# Patient Record
Sex: MALE | Race: ASIAN | NOT HISPANIC OR LATINO
[De-identification: names, ages, dates, MRNs, and addresses within clinical notes are randomized per-mention and may not be internally consistent; named-entity substitution may affect disease eponyms.]

---

## 2021-01-04 ENCOUNTER — APPOINTMENT (OUTPATIENT)
Dept: HEPATOLOGY | Facility: CLINIC | Age: 41
End: 2021-01-04

## 2021-02-13 ENCOUNTER — EMERGENCY (EMERGENCY)
Facility: HOSPITAL | Age: 41
LOS: 1 days | Discharge: HOME | End: 2021-02-13
Attending: EMERGENCY MEDICINE
Payer: MEDICAID

## 2021-02-13 VITALS
RESPIRATION RATE: 20 BRPM | OXYGEN SATURATION: 99 % | WEIGHT: 199.96 LBS | HEART RATE: 84 BPM | SYSTOLIC BLOOD PRESSURE: 117 MMHG | TEMPERATURE: 98 F | DIASTOLIC BLOOD PRESSURE: 76 MMHG | HEIGHT: 67 IN

## 2021-02-13 VITALS
DIASTOLIC BLOOD PRESSURE: 64 MMHG | TEMPERATURE: 98 F | OXYGEN SATURATION: 100 % | RESPIRATION RATE: 18 BRPM | HEART RATE: 72 BPM | SYSTOLIC BLOOD PRESSURE: 132 MMHG

## 2021-02-13 DIAGNOSIS — K64.4 RESIDUAL HEMORRHOIDAL SKIN TAGS: Chronic | ICD-10-CM

## 2021-02-13 LAB
HCT VFR BLD CALC: 43.8 % — SIGNIFICANT CHANGE UP (ref 39–50)
HGB BLD-MCNC: 15.1 G/DL — SIGNIFICANT CHANGE UP (ref 13–17)
MCHC RBC-ENTMCNC: 30.3 PG — SIGNIFICANT CHANGE UP (ref 27–34)
MCHC RBC-ENTMCNC: 34.5 GM/DL — SIGNIFICANT CHANGE UP (ref 32–36)
MCV RBC AUTO: 87.8 FL — SIGNIFICANT CHANGE UP (ref 80–100)
NRBC # BLD: 0 /100 WBCS — SIGNIFICANT CHANGE UP (ref 0–0)
PLATELET # BLD AUTO: 288 K/UL — SIGNIFICANT CHANGE UP (ref 150–400)
RBC # BLD: 4.99 M/UL — SIGNIFICANT CHANGE UP (ref 4.2–5.8)
RBC # FLD: 11.5 % — SIGNIFICANT CHANGE UP (ref 10.3–14.5)
WBC # BLD: 5.94 K/UL — SIGNIFICANT CHANGE UP (ref 3.8–10.5)
WBC # FLD AUTO: 5.94 K/UL — SIGNIFICANT CHANGE UP (ref 3.8–10.5)

## 2021-02-13 PROCEDURE — 85027 COMPLETE CBC AUTOMATED: CPT

## 2021-02-13 PROCEDURE — 99283 EMERGENCY DEPT VISIT LOW MDM: CPT

## 2021-02-13 PROCEDURE — 99284 EMERGENCY DEPT VISIT MOD MDM: CPT

## 2021-02-13 NOTE — ED PROVIDER NOTE - NSFOLLOWUPINSTRUCTIONS_ED_ALL_ED_FT
-Please take Tylenol up to 650 mg every 6 hours as needed for pain and/or Motrin up to 600 mg every 8 hours as needed for pain    -Please take any prescribed medications as instructed by your PMD    - Be sure to return to the ED if you develop new or worsening symptoms. Specific signs and symptoms to be vigilant of: fever or chills, chest pain, difficulty breathing, palpitations, loss of consciousness, headache, vision changes, slurred speech, difficulty swallowing or drooling, facial droop, weakness in the arms or legs, numbness or tingling, abdominal pain, nausea or vomiting, diarrhea, constipation, blood in the stool or urine, pain on urination, difficulty urinating.     -Please follow up with surgery clinic for external hemorrhoids    -Please use stool softeners to help with constipation

## 2021-02-13 NOTE — ED ADULT NURSE NOTE - OBJECTIVE STATEMENT
Pt is a 39 yo male with the co rectal pain worsening over the past year. Pt states he has a history of hemorrhoids and has had several surgeries over the past year. Pt states that he occi Pt is a 41 yo male with the co rectal pain worsening over the past year. Pt states he has a history of hemorrhoids and has had several surgeries over the past year. Pt states that he has rectal bleeding every day but the amount of bleeding changes per day. Pt denies any CP or SOB no N/V/D no cough fever or chills. Pt reports taking tylenol at home when the pain worsens at times. He denies any weakness or dizziness.  No other pmh.

## 2021-02-13 NOTE — ED PROVIDER NOTE - ATTENDING CONTRIBUTION TO CARE
RGUJRAL 39yo male hx hemorrhoid (for years) presents with rectal burning and bleeding. Patient states he has had multiple procedures in [place] but a week later starts to bleed again. Denies any abdominal pain, n/v/f/c. No diarrhea, nml bms.   On exam, Patient is awake,alert,oriented x 3. Patient is well appearing and in no acute distress. Patient's chest is clear to ausculation, +s1s2. Abdomen is soft nd/nt +BS. Extremity with no swelling or calf tenderness. Rectal exam, + small external hemorrhoid. nontender/no fluctuance/ no perineal involvement.  Denies any chest pain, sob, lightheadedness.   Discussed w patient at length, discharge with [facility]. Will enroll patient in ed referral program.

## 2021-02-13 NOTE — ED PROVIDER NOTE - OBJECTIVE STATEMENT
39y/o M w/ h/o external hemorrhoids s/p surgical interventionsx4 at [facility] p/w painful hemorrhoids for the past 2 mo, worse with bowel movements. Denies fevers, chills, n/v/abdominal pain, urinary complaints. 41y/o M w/ h/o external hemorrhoids s/p surgical interventionsx4 at [facility] p/w painful hemorrhoids for the past 2 mo, worse with bowel movements. Tried sitz baths prior to surgeries with minimal relief, reports mild bleeding. Denies syncope, fevers, chills, n/v/abdominal pain, urinary complaints.

## 2021-02-13 NOTE — ED PROVIDER NOTE - NSFOLLOWUPCLINICS_GEN_ALL_ED_FT
[facility]  [address] [place] [address]  Phone: [phone]  Fax: [phone]  Follow Up Time: 1-3 Days

## 2021-02-13 NOTE — ED PROVIDER NOTE - PATIENT PORTAL LINK FT
You can access the FollowMyHealth Patient Portal offered by [facility] by registering at the following website: [email or web address]. By joining [facility]’s FollowMyHealth portal, you will also be able to view your health information using other applications (apps) compatible with our system.

## 2021-02-16 PROBLEM — Z00.00 ENCOUNTER FOR PREVENTIVE HEALTH EXAMINATION: Status: ACTIVE | Noted: 2021-02-16

## 2025-04-17 NOTE — ED PROVIDER NOTE - CPE EDP EYES NORM
Dr. Carolina Post Op Cataract Instructions  For Nahomy COLBY Osvaldo    See Dr. Carolina tomorrow at the office. Wear either your glasses or the sunglasses (if jeffry outside) provided in the kit for protection. Tape the shield over the eye when sleeping for 1 week (unless you have a breathing mask and can't fit the shield on). Avoid bumping or rubbing the eye. Stop using the eye lid scrubs in the operative eye.    You may resume your diet and regular medications. Take Tylenol for pain (unless you are allergic).    Because you had sedatives, make sure you have someone stay with you on the first night after cataract surgery. Please be careful to avoid falling.    Driving can be resumed 24 hours after surgery if you see well and are comfortable drving.    EYE DROPS are put in by looking up with both eyes open while pulling down the lower lid. Put one drop into the ''crease'' between the lower lid and the eye. Close the eyes for 30 seconds. Wait 3 to 5 minutes between drops.    Prednisolone/Moxifloxacin/Bromfenac (combination steroid/antibiotic/NSAID bottle)        Continue after surgery 4x/d for 1 wk, then 3x/day for 2 wks, then 2x/d until gone. No refills needed.       (If you were prescribed different eye meds, use those with the above dosing schedule.)    Continue all regular glaucoma drops even on the day of surgery.    If you were prescribed acetazolamide pills, take pill(s) when you get home, then before you sleep, then 6am the following day.     Other:    BRING ALL EYE MEDS FOR ALL FOLLOW UP APPOINTMENTS.    If a shield was placed, leave this on until you are seen tomorrow. Start your eye meds the next day after we remove the shield in .the office.    Activity. For 1 week, no heavy lifting, bending, straining, working in a dirty environment, or putting makeup on your eyelids/lashes. You may shampoo, but avoid soap or water from getting into your eye. You may visit the hairdresser. Swimming may be resumed after 4 weeks.  For the next 3 months, avoid contact sports. Use protective eyewear indefinitely to prevent injury to your eye (it will always be more susceptible to damage from trauma.)    After surgery, It is normal to have a scratchy sensation and a little blurring of your vision. Distance vision is typically better without glasses. Near vision is typically better with readers. After a month, new glasses are often prescribed.    CALL THE OFFICE IMMEDIATELY IF:    the vision is worse or you have sudden loss of vision.    the operated eye gets increasingly red or painful.    you develop worsening of black spots or floaters in the vision. Seeing old pre-existing floaters is expected and not to be concerned about.    You can reach us by calling the office/answering service at [phone] and indicate you have a post-operative emergency. We will be paged. If you don't receive a call back, call again. If you are still unable to reach us, go to the E.R.   normal...